# Patient Record
Sex: FEMALE | Race: WHITE | NOT HISPANIC OR LATINO | URBAN - METROPOLITAN AREA
[De-identification: names, ages, dates, MRNs, and addresses within clinical notes are randomized per-mention and may not be internally consistent; named-entity substitution may affect disease eponyms.]

---

## 2017-01-01 ENCOUNTER — INPATIENT (INPATIENT)
Facility: HOSPITAL | Age: 77
LOS: 0 days | End: 2017-06-11
Attending: INTERNAL MEDICINE | Admitting: INTERNAL MEDICINE
Payer: MEDICARE

## 2017-01-01 VITALS — HEIGHT: 62 IN | WEIGHT: 207.01 LBS

## 2017-01-01 VITALS — HEART RATE: 42 BPM

## 2017-01-01 LAB
ALBUMIN SERPL ELPH-MCNC: 2.3 G/DL — LOW (ref 3.3–5)
ALBUMIN SERPL ELPH-MCNC: 3.7 G/DL — SIGNIFICANT CHANGE UP (ref 3.3–5)
ALP SERPL-CCNC: 65 U/L — SIGNIFICANT CHANGE UP (ref 40–120)
ALP SERPL-CCNC: 72 U/L — SIGNIFICANT CHANGE UP (ref 40–120)
ALT FLD-CCNC: 21 U/L — SIGNIFICANT CHANGE UP (ref 12–78)
ALT FLD-CCNC: 36 U/L — SIGNIFICANT CHANGE UP (ref 12–78)
ANION GAP SERPL CALC-SCNC: 10 MMOL/L — SIGNIFICANT CHANGE UP (ref 5–17)
ANION GAP SERPL CALC-SCNC: 16 MMOL/L — SIGNIFICANT CHANGE UP (ref 5–17)
APTT BLD: 26.9 SEC — LOW (ref 27.5–37.4)
AST SERPL-CCNC: 155 U/L — HIGH (ref 15–37)
AST SERPL-CCNC: 290 U/L — HIGH (ref 15–37)
BASE EXCESS BLDA CALC-SCNC: -22 MMOL/L — LOW (ref -2–2)
BASOPHILS # BLD AUTO: 0.1 K/UL — SIGNIFICANT CHANGE UP (ref 0–0.2)
BASOPHILS NFR BLD AUTO: 0.5 % — SIGNIFICANT CHANGE UP (ref 0–2)
BILIRUB DIRECT SERPL-MCNC: 0.2 MG/DL — SIGNIFICANT CHANGE UP (ref 0–0.2)
BILIRUB INDIRECT FLD-MCNC: 0.2 MG/DL — SIGNIFICANT CHANGE UP (ref 0.2–1)
BILIRUB SERPL-MCNC: 0.3 MG/DL — SIGNIFICANT CHANGE UP (ref 0.2–1.2)
BILIRUB SERPL-MCNC: 0.4 MG/DL — SIGNIFICANT CHANGE UP (ref 0.2–1.2)
BUN SERPL-MCNC: 38 MG/DL — HIGH (ref 7–23)
BUN SERPL-MCNC: 41 MG/DL — HIGH (ref 7–23)
CALCIUM SERPL-MCNC: 7.3 MG/DL — LOW (ref 8.5–10.1)
CALCIUM SERPL-MCNC: 9 MG/DL — SIGNIFICANT CHANGE UP (ref 8.5–10.1)
CHLORIDE SERPL-SCNC: 110 MMOL/L — HIGH (ref 96–108)
CHLORIDE SERPL-SCNC: 118 MMOL/L — HIGH (ref 96–108)
CO2 SERPL-SCNC: 11 MMOL/L — LOW (ref 22–31)
CO2 SERPL-SCNC: 22 MMOL/L — SIGNIFICANT CHANGE UP (ref 22–31)
CREAT SERPL-MCNC: 1.98 MG/DL — HIGH (ref 0.5–1.3)
CREAT SERPL-MCNC: 2.34 MG/DL — HIGH (ref 0.5–1.3)
EOSINOPHIL # BLD AUTO: 0 K/UL — SIGNIFICANT CHANGE UP (ref 0–0.5)
EOSINOPHIL NFR BLD AUTO: 0.1 % — SIGNIFICANT CHANGE UP (ref 0–6)
GLUCOSE SERPL-MCNC: 373 MG/DL — HIGH (ref 70–99)
GLUCOSE SERPL-MCNC: 494 MG/DL — CRITICAL HIGH (ref 70–99)
HCO3 BLDA-SCNC: 9 MMOL/L — LOW (ref 21–29)
HCT VFR BLD CALC: 38.8 % — SIGNIFICANT CHANGE UP (ref 34.5–45)
HGB BLD-MCNC: 12.6 G/DL — SIGNIFICANT CHANGE UP (ref 11.5–15.5)
HOROWITZ INDEX BLDA+IHG-RTO: SIGNIFICANT CHANGE UP
INR BLD: 0.99 RATIO — SIGNIFICANT CHANGE UP (ref 0.88–1.16)
LACTATE SERPL-SCNC: 12.6 MMOL/L — CRITICAL HIGH (ref 0.7–2)
LACTATE SERPL-SCNC: 3.6 MMOL/L — HIGH (ref 0.7–2)
LIDOCAIN IGE QN: 125 U/L — SIGNIFICANT CHANGE UP (ref 73–393)
LYMPHOCYTES # BLD AUTO: 1.2 K/UL — SIGNIFICANT CHANGE UP (ref 1–3.3)
LYMPHOCYTES # BLD AUTO: 10.8 % — LOW (ref 13–44)
MAGNESIUM SERPL-MCNC: 2.4 MG/DL — SIGNIFICANT CHANGE UP (ref 1.6–2.6)
MCHC RBC-ENTMCNC: 28.7 PG — SIGNIFICANT CHANGE UP (ref 27–34)
MCHC RBC-ENTMCNC: 32.6 GM/DL — SIGNIFICANT CHANGE UP (ref 32–36)
MCV RBC AUTO: 88.3 FL — SIGNIFICANT CHANGE UP (ref 80–100)
MONOCYTES # BLD AUTO: 0.4 K/UL — SIGNIFICANT CHANGE UP (ref 0–0.9)
MONOCYTES NFR BLD AUTO: 3.4 % — SIGNIFICANT CHANGE UP (ref 2–14)
NEUTROPHILS # BLD AUTO: 9.6 K/UL — HIGH (ref 1.8–7.4)
NEUTROPHILS NFR BLD AUTO: 85.2 % — HIGH (ref 43–77)
NT-PROBNP SERPL-SCNC: 5150 PG/ML — HIGH (ref 0–450)
PCO2 BLDA: 37 MMHG — SIGNIFICANT CHANGE UP (ref 32–46)
PH BLDA: 7 — CRITICAL LOW (ref 7.35–7.45)
PLATELET # BLD AUTO: 194 K/UL — SIGNIFICANT CHANGE UP (ref 150–400)
PO2 BLDA: 82 — SIGNIFICANT CHANGE UP
POTASSIUM SERPL-MCNC: 5.2 MMOL/L — SIGNIFICANT CHANGE UP (ref 3.5–5.3)
POTASSIUM SERPL-MCNC: 6.6 MMOL/L — CRITICAL HIGH (ref 3.5–5.3)
POTASSIUM SERPL-SCNC: 5.2 MMOL/L — SIGNIFICANT CHANGE UP (ref 3.5–5.3)
POTASSIUM SERPL-SCNC: 6.6 MMOL/L — CRITICAL HIGH (ref 3.5–5.3)
PROT SERPL-MCNC: 5.3 GM/DL — LOW (ref 6–8.3)
PROT SERPL-MCNC: 7.3 GM/DL — SIGNIFICANT CHANGE UP (ref 6–8.3)
PROTHROM AB SERPL-ACNC: 10.7 SEC — SIGNIFICANT CHANGE UP (ref 9.8–12.7)
RAPID RVP RESULT: SIGNIFICANT CHANGE UP
RBC # BLD: 4.39 M/UL — SIGNIFICANT CHANGE UP (ref 3.8–5.2)
RBC # FLD: 13.2 % — SIGNIFICANT CHANGE UP (ref 10.3–14.5)
SAO2 % BLDA: 88 — SIGNIFICANT CHANGE UP
SODIUM SERPL-SCNC: 142 MMOL/L — SIGNIFICANT CHANGE UP (ref 135–145)
SODIUM SERPL-SCNC: 145 MMOL/L — SIGNIFICANT CHANGE UP (ref 135–145)
TROPONIN I SERPL-MCNC: 106 NG/ML — HIGH (ref 0.01–0.04)
TROPONIN I SERPL-MCNC: 29.5 NG/ML — HIGH (ref 0.01–0.04)
TROPONIN I SERPL-MCNC: 34.7 NG/ML — HIGH (ref 0.01–0.04)
TSH SERPL-MCNC: 0.41 UU/ML — SIGNIFICANT CHANGE UP (ref 0.36–3.74)
WBC # BLD: 11.2 K/UL — HIGH (ref 3.8–10.5)
WBC # FLD AUTO: 11.2 K/UL — HIGH (ref 3.8–10.5)

## 2017-01-01 PROCEDURE — 93306 TTE W/DOPPLER COMPLETE: CPT | Mod: 26

## 2017-01-01 PROCEDURE — 93010 ELECTROCARDIOGRAM REPORT: CPT | Mod: 77

## 2017-01-01 PROCEDURE — 71010: CPT | Mod: 26

## 2017-01-01 PROCEDURE — 99291 CRITICAL CARE FIRST HOUR: CPT | Mod: 25

## 2017-01-01 PROCEDURE — 93010 ELECTROCARDIOGRAM REPORT: CPT

## 2017-01-01 PROCEDURE — 31500 INSERT EMERGENCY AIRWAY: CPT

## 2017-01-01 RX ORDER — PIPERACILLIN AND TAZOBACTAM 4; .5 G/20ML; G/20ML
3.38 INJECTION, POWDER, LYOPHILIZED, FOR SOLUTION INTRAVENOUS EVERY 8 HOURS
Qty: 0 | Refills: 0 | Status: DISCONTINUED | OUTPATIENT
Start: 2017-01-01 | End: 2017-01-01

## 2017-01-01 RX ORDER — CLOPIDOGREL BISULFATE 75 MG/1
75 TABLET, FILM COATED ORAL DAILY
Qty: 0 | Refills: 0 | Status: DISCONTINUED | OUTPATIENT
Start: 2017-01-01 | End: 2017-01-01

## 2017-01-01 RX ORDER — PIPERACILLIN AND TAZOBACTAM 4; .5 G/20ML; G/20ML
3.38 INJECTION, POWDER, LYOPHILIZED, FOR SOLUTION INTRAVENOUS ONCE
Qty: 0 | Refills: 0 | Status: DISCONTINUED | OUTPATIENT
Start: 2017-01-01 | End: 2017-01-01

## 2017-01-01 RX ORDER — HEPARIN SODIUM 5000 [USP'U]/ML
5600 INJECTION INTRAVENOUS; SUBCUTANEOUS EVERY 6 HOURS
Qty: 0 | Refills: 0 | Status: DISCONTINUED | OUTPATIENT
Start: 2017-01-01 | End: 2017-01-01

## 2017-01-01 RX ORDER — SODIUM CHLORIDE 9 MG/ML
3 INJECTION INTRAMUSCULAR; INTRAVENOUS; SUBCUTANEOUS ONCE
Qty: 0 | Refills: 0 | Status: COMPLETED | OUTPATIENT
Start: 2017-01-01 | End: 2017-01-01

## 2017-01-01 RX ORDER — CLOPIDOGREL BISULFATE 75 MG/1
300 TABLET, FILM COATED ORAL ONCE
Qty: 0 | Refills: 0 | Status: DISCONTINUED | OUTPATIENT
Start: 2017-01-01 | End: 2017-01-01

## 2017-01-01 RX ORDER — VASOPRESSIN 20 [USP'U]/ML
0.04 INJECTION INTRAVENOUS
Qty: 100 | Refills: 0 | Status: DISCONTINUED | OUTPATIENT
Start: 2017-01-01 | End: 2017-01-01

## 2017-01-01 RX ORDER — AZITHROMYCIN 500 MG/1
500 TABLET, FILM COATED ORAL ONCE
Qty: 0 | Refills: 0 | Status: COMPLETED | OUTPATIENT
Start: 2017-01-01 | End: 2017-01-01

## 2017-01-01 RX ORDER — ATORVASTATIN CALCIUM 80 MG/1
40 TABLET, FILM COATED ORAL AT BEDTIME
Qty: 0 | Refills: 0 | Status: DISCONTINUED | OUTPATIENT
Start: 2017-01-01 | End: 2017-01-01

## 2017-01-01 RX ORDER — ASPIRIN/CALCIUM CARB/MAGNESIUM 324 MG
325 TABLET ORAL ONCE
Qty: 0 | Refills: 0 | Status: COMPLETED | OUTPATIENT
Start: 2017-01-01 | End: 2017-01-01

## 2017-01-01 RX ORDER — METOCLOPRAMIDE HCL 10 MG
10 TABLET ORAL ONCE
Qty: 0 | Refills: 0 | Status: COMPLETED | OUTPATIENT
Start: 2017-01-01 | End: 2017-01-01

## 2017-01-01 RX ORDER — IPRATROPIUM/ALBUTEROL SULFATE 18-103MCG
3 AEROSOL WITH ADAPTER (GRAM) INHALATION
Qty: 0 | Refills: 0 | Status: COMPLETED | OUTPATIENT
Start: 2017-01-01 | End: 2017-01-01

## 2017-01-01 RX ORDER — KETAMINE HYDROCHLORIDE 100 MG/ML
200 INJECTION INTRAMUSCULAR; INTRAVENOUS
Qty: 200 | Refills: 0 | Status: DISCONTINUED | OUTPATIENT
Start: 2017-01-01 | End: 2017-01-01

## 2017-01-01 RX ORDER — CEFTRIAXONE 500 MG/1
1 INJECTION, POWDER, FOR SOLUTION INTRAMUSCULAR; INTRAVENOUS ONCE
Qty: 0 | Refills: 0 | Status: COMPLETED | OUTPATIENT
Start: 2017-01-01 | End: 2017-01-01

## 2017-01-01 RX ORDER — HEPARIN SODIUM 5000 [USP'U]/ML
INJECTION INTRAVENOUS; SUBCUTANEOUS
Qty: 25000 | Refills: 0 | Status: DISCONTINUED | OUTPATIENT
Start: 2017-01-01 | End: 2017-01-01

## 2017-01-01 RX ORDER — NOREPINEPHRINE BITARTRATE/D5W 8 MG/250ML
1 PLASTIC BAG, INJECTION (ML) INTRAVENOUS
Qty: 8 | Refills: 0 | Status: DISCONTINUED | OUTPATIENT
Start: 2017-01-01 | End: 2017-01-01

## 2017-01-01 RX ORDER — SODIUM CHLORIDE 9 MG/ML
1000 INJECTION INTRAMUSCULAR; INTRAVENOUS; SUBCUTANEOUS
Qty: 0 | Refills: 0 | Status: DISCONTINUED | OUTPATIENT
Start: 2017-01-01 | End: 2017-01-01

## 2017-01-01 RX ORDER — HEPARIN SODIUM 5000 [USP'U]/ML
5000 INJECTION INTRAVENOUS; SUBCUTANEOUS ONCE
Qty: 0 | Refills: 0 | Status: COMPLETED | OUTPATIENT
Start: 2017-01-01 | End: 2017-01-01

## 2017-01-01 RX ORDER — PIPERACILLIN AND TAZOBACTAM 4; .5 G/20ML; G/20ML
3000 INJECTION, POWDER, LYOPHILIZED, FOR SOLUTION INTRAVENOUS EVERY 6 HOURS
Qty: 3000 | Refills: 0 | Status: DISCONTINUED | OUTPATIENT
Start: 2017-01-01 | End: 2017-01-01

## 2017-01-01 RX ORDER — ASPIRIN/CALCIUM CARB/MAGNESIUM 324 MG
325 TABLET ORAL DAILY
Qty: 0 | Refills: 0 | Status: DISCONTINUED | OUTPATIENT
Start: 2017-01-01 | End: 2017-01-01

## 2017-01-01 RX ORDER — SUCCINYLCHOLINE CHLORIDE 100 MG/5ML
150 SYRINGE (ML) INTRAVENOUS ONCE
Qty: 0 | Refills: 0 | Status: COMPLETED | OUTPATIENT
Start: 2017-01-01 | End: 2017-01-01

## 2017-01-01 RX ORDER — KETAMINE HYDROCHLORIDE 100 MG/ML
188 INJECTION INTRAMUSCULAR; INTRAVENOUS ONCE
Qty: 0 | Refills: 0 | Status: DISCONTINUED | OUTPATIENT
Start: 2017-01-01 | End: 2017-01-01

## 2017-01-01 RX ORDER — SODIUM CHLORIDE 9 MG/ML
2800 INJECTION INTRAMUSCULAR; INTRAVENOUS; SUBCUTANEOUS ONCE
Qty: 0 | Refills: 0 | Status: COMPLETED | OUTPATIENT
Start: 2017-01-01 | End: 2017-01-01

## 2017-01-01 RX ORDER — FUROSEMIDE 40 MG
40 TABLET ORAL ONCE
Qty: 0 | Refills: 0 | Status: COMPLETED | OUTPATIENT
Start: 2017-01-01 | End: 2017-01-01

## 2017-01-01 RX ORDER — ONDANSETRON 8 MG/1
8 TABLET, FILM COATED ORAL ONCE
Qty: 0 | Refills: 0 | Status: COMPLETED | OUTPATIENT
Start: 2017-01-01 | End: 2017-01-01

## 2017-01-01 RX ADMIN — SODIUM CHLORIDE 3 MILLILITER(S): 9 INJECTION INTRAMUSCULAR; INTRAVENOUS; SUBCUTANEOUS at 08:44

## 2017-01-01 RX ADMIN — CEFTRIAXONE 100 GRAM(S): 500 INJECTION, POWDER, FOR SOLUTION INTRAMUSCULAR; INTRAVENOUS at 09:21

## 2017-01-01 RX ADMIN — Medication 176.06 MICROGRAM(S)/KG/MIN: at 11:01

## 2017-01-01 RX ADMIN — Medication 10 MILLIGRAM(S): at 10:58

## 2017-01-01 RX ADMIN — ONDANSETRON 8 MILLIGRAM(S): 8 TABLET, FILM COATED ORAL at 09:21

## 2017-01-01 RX ADMIN — Medication 325 MILLIGRAM(S): at 08:44

## 2017-01-01 RX ADMIN — HEPARIN SODIUM 1 UNIT(S): 5000 INJECTION INTRAVENOUS; SUBCUTANEOUS at 10:34

## 2017-01-01 RX ADMIN — Medication 150 MILLIGRAM(S): at 12:38

## 2017-01-01 RX ADMIN — Medication 325 MILLIGRAM(S): at 10:24

## 2017-01-01 RX ADMIN — KETAMINE HYDROCHLORIDE 188 MILLIGRAM(S): 100 INJECTION INTRAMUSCULAR; INTRAVENOUS at 12:36

## 2017-01-01 RX ADMIN — Medication 125 MILLIGRAM(S): at 09:21

## 2017-01-01 RX ADMIN — Medication 176.06 MICROGRAM(S)/KG/MIN: at 16:15

## 2017-01-01 RX ADMIN — Medication 3 MILLILITER(S): at 10:19

## 2017-01-01 RX ADMIN — Medication 3 MILLILITER(S): at 09:22

## 2017-01-01 RX ADMIN — Medication 3 MILLILITER(S): at 11:55

## 2017-01-01 RX ADMIN — Medication 40 MILLIGRAM(S): at 11:01

## 2017-01-01 RX ADMIN — AZITHROMYCIN 255 MILLIGRAM(S): 500 TABLET, FILM COATED ORAL at 09:35

## 2017-01-01 RX ADMIN — SODIUM CHLORIDE 2800 MILLILITER(S): 9 INJECTION INTRAMUSCULAR; INTRAVENOUS; SUBCUTANEOUS at 08:44

## 2017-01-01 RX ADMIN — VASOPRESSIN 2.4 UNIT(S)/MIN: 20 INJECTION INTRAVENOUS at 16:15

## 2017-06-11 NOTE — CONSULT NOTE ADULT - ASSESSMENT
Acute MI  Acute vs acute on chronic systolic /diastolic CHF  Hypotension  Cardiogenic shock  r/o PNA  Renal failure - possibly acute  Uncontrolled DM  PAD - s/p b/l CEA    Suggest:    CCU  O2 supplement  ABG  BiPAP is BP allows  Levophed or neosynepherine  IABP if Bp remains low  Echo stat to evaluate EF, valves  Diuresis  No ACEi ARBs till renal function is stable  beta blockers when CHF stabilizes  f/u renal function, electrolytes  ischemia evaluation when pulm status, renal statue stabilizes - will eventually need cardiac cath  Follow up Cardiac enzymes  Treat with aspirin, Plavix  IV Heparin  Statins  Nitrates PRN if BP allows  CT chest - r/o PNA  Optimize DM control  Low salt, low cholesterol 1800 terri ADA diet  Fluid restriction  Consult with Intensive Care MD - called    Not going to the cath lab at this time because of comorbidities and event being > 12 hours ago. Stabilize overall condition first.     D/W pt's family at bedside    Dr Otero to assume pt's care from AM

## 2017-06-11 NOTE — CONSULT NOTE ADULT - ASSESSMENT
PROBLEMS; 77 Y.O.F    Ac respiratory failure  Septic/cardiogenic  shock  Metabolic acidosis  RLL pneumonia  AMI    PLAN:    [facility]  Intubate  Iv abx zosyn  Change levophed to nicole  CT chest/abdomen  supportive care  DVT prophylasix

## 2017-06-11 NOTE — ED PROVIDER NOTE - PROGRESS NOTE DETAILS
paged dr. sandoval [phone] , request ekg , 10 am notified to call keshia. pt and family updated and pt admitted . dr. sandoval request heaprin and ASA PCI was not called intitially because no prior ekg and ekg significant for RBBB and repeat ekg no change

## 2017-06-11 NOTE — CONSULT NOTE ADULT - SUBJECTIVE AND OBJECTIVE BOX
CC:    Sob/hypotension/bipap      HPI:    78 yo female with progressive dyspnea/orthopnea/pressure in the chest for the last 3-4 days but worse now. She has diarrhea & was eating ice cream, pat came to ER with positive troponins 30 & EKG changes, hypotension & desaturating, pat was put on bipap & started iv fluids & & levophed & now tachcardic 160, ABG ABG - pH: 7.00  /  pCO2: 37    /  pO2: 82    / HCO3: 9     / Base Excess: -22   /  SaO2: 88 , CXR RLL pneumonia, Abd pain & discomfort.Pat on 3rd liters. Pat  now will intubated & will get CT abdomen & chest. PT also got Iv ABX.              PAST MEDICAL & SURGICAL HISTORY:  Essential hypertension  DM  HLD  No significant past surgical history      FAMILY HISTORY:  No pertinent family history in first degree relatives      Social Hx:    Allergies    No Known Allergies    Intolerances        Height (cm): 157.5 (06-11 @ 08:08)  Weight (kg): 93.9 (06-11 @ 08:08)  BMI (kg/m2): 37.9 (06-11 @ 08:08)    ICU Vital Signs Last 24 Hrs  T(C): 36.5, Max: 36.5 (06-11 @ 08:30)  T(F): 97.7, Max: 97.7 (06-11 @ 08:30)  HR: 155 (111 - 159)  BP: 123/86 (93/68 - 124/81)  BP(mean): --  ABP: --  ABP(mean): --  RR: 25 (16 - 27)  SpO2: 96% (90% - 100%)          I&O's Summary                            12.6   11.2  )-----------( 194      ( 11 Jun 2017 08:38 )             38.8       06-11    142  |  110<H>  |  41<H>  ----------------------------<  373<H>  5.2   |  22  |  1.98<H>    Ca    9.0      11 Jun 2017 08:38  Mg     2.4     06-11    TPro  7.3  /  Alb  3.7  /  TBili  0.3  /  DBili  x   /  AST  155<H>  /  ALT  21  /  AlkPhos  72  06-11      CARDIAC MARKERS ( 11 Jun 2017 11:01 )  34.700 ng/mL / x     / x     / x     / x      CARDIAC MARKERS ( 11 Jun 2017 08:38 )  29.500 ng/mL / x     / x     / x     / x                    MEDICATIONS  (STANDING):  aspirin enteric coated 325milliGRAM(s) Oral daily  norepinephrine Infusion 1MICROgram(s)/kG/Min IV Continuous <Continuous>  clopidogrel Tablet 300milliGRAM(s) Oral once  clopidogrel Tablet 75milliGRAM(s) Oral daily  heparin  Infusion. Unit(s)/Hr IV Continuous <Continuous>  atorvastatin 40milliGRAM(s) Oral at bedtime    MEDICATIONS  (PRN):  heparin  Injectable 5600Unit(s) IV Push every 6 hours PRN For aPTT less than 40      DVT Prophylaxis:    Advanced Directives:  Discussed with:    Visit Information:    ** Time is exclusive of billed procedures and/or teaching and/or routine family updates.

## 2017-06-11 NOTE — PROCEDURE NOTE - NSPROCDETAILS_GEN_ALL_CORE
sterile technique, catheter placed/guidewire recovered/lumen(s) aspirated and flushed/ultrasound guidance/sterile dressing applied

## 2017-06-11 NOTE — ED ADULT NURSE REASSESSMENT NOTE - NS ED NURSE REASSESS COMMENT FT1
Pt currently getting bedside ECHO, RN to take pt to CT scan after ECHO completed. MD Ramirez consulted with patient. CCU called for report, CCU to come to transfer pt to unit after CT scan of abdomen completed.

## 2017-06-11 NOTE — ED PROVIDER NOTE - MEDICAL DECISION MAKING DETAILS
76 yo F with hx of HTN, DM, hld presents with chest pain, sob, diarrhea for 1 day will obtain labs and ct

## 2017-06-11 NOTE — ED PROVIDER NOTE - OBJECTIVE STATEMENT
76 yo F with hx of HTN, HLD, DM with cp and sob,. for 1 day denies HA, dizziness, abdpaoin, n/v. pt has diarrhea

## 2017-06-11 NOTE — ED PROCEDURE NOTE - CPROC ED TRACHE INTUB DETAIL1
Patient was pre-oxygenated. An endotracheal tube (ETT) was placed through the vocal cords into the trachea. During intubation, staff applied gentle pressure to the cricoid cartilage. ETT position was confirmed by auscultation of bilateral breath sounds to all lung fields. ETCO2 level was appropriate./Difficult/crash intubation (see additional details section)./Patient connected to ventilator with settings as ordered.

## 2017-06-11 NOTE — PROGRESS NOTE ADULT - SUBJECTIVE AND OBJECTIVE BOX
Labs and events reviewed. Pt with severe metabolic acidosis. High lactic acid levels. Increasing creatinine and LFTs. Multiorgan failure. Shock, not responding to pressors. Profound bradycardia and asystole noted. CPR and ACLS protocol started. She could not be resuscitated. Family at bedside.

## 2017-06-11 NOTE — ED ADULT NURSE REASSESSMENT NOTE - NS ED NURSE REASSESS COMMENT FT1
Pt's status declined. ABG results came back, pt requiring intubation. MD [place] and respiratory therapy performed intubation, ET tube size 7.5, 23 lip OGT also inserted. Succinycholine given as well as Ketamine IVP. Pharmacy to make ketamine drip for pt. Pt still receiving IV pressor medication. RN's at bedside. CCU made aware, pt to be transferred as soon as ready to unit. RN to continue to monitor. Pt's status declined. ABG results came back, pt requiring intubation. MD Burnham and respiratory therapy performed intubation, ET tube size 7.5, 23 lip OGT also inserted at 1230. OGT with 100cc's of secretion. Intubation confirmed with ETCO2 color change noted, b/l breath sounds with chest rise and CXR completed. RN to continue to monitor. Awaiting transport to CT scan then to CCU. Pt's status declined. ABG results came back, pt requiring intubation. MD Burnham and respiratory therapy performed intubation, ET tube size 7.5, 23 lip OGT also inserted at 1230. OGT with 100cc's of secretion. Intubation confirmed with ETCO2 color change noted, b/l breath sounds with chest rise and CXR completed. Pt's skin very pale, cool and clammy, capillary refill 6-8 secs, thready central and peripheral pulses noted, chest cyanotic, MD made aware and at bedside re-assessing patient with intensivist and cardiology notified. Family present at bedside, aware of patient declining status. RN to continue to monitor.

## 2017-06-11 NOTE — ED PROVIDER NOTE - CRITICAL CARE PROVIDED
direct patient care (not related to procedure)/consultation with other physicians/documentation/additional history taking/interpretation of diagnostic studies

## 2017-06-11 NOTE — ED ADULT NURSE NOTE - OBJECTIVE STATEMENT
Pt c/o SOB since yesterday, came in this morning stating she felt nauseous, dizzy and having trouble breathing.

## 2017-06-11 NOTE — ED PROVIDER NOTE - CARE PLAN
Principal Discharge DX:	Shortness of breath  Secondary Diagnosis:	Diarrhea in adult patient Principal Discharge DX:	Shortness of breath  Secondary Diagnosis:	Diarrhea in adult patient  Secondary Diagnosis:	NSTEMI (non-ST elevated myocardial infarction)

## 2017-06-11 NOTE — ED ADULT NURSE REASSESSMENT NOTE - NS ED NURSE REASSESS COMMENT FT1
Pt transferred from bed 11 to trauma room 3. Pt's BP has been deteriorating, and pt's WOB has worseened. Pt put on norepi drip and IV bolus given as per orders. Respiratory therapy at bedside, pt placed on CPAP and O2 status has since improved. Pt's BP has increased since norepi drip (see flow-sheet). Pt awaiting transfer to CCU. RN to continue to monitor.

## 2017-06-11 NOTE — CONSULT NOTE ADULT - SUBJECTIVE AND OBJECTIVE BOX
Chief complaint of dyspnea, chest tightness    HPI: 78 yo female with dyspnea, progressively worsening over the last one week. She has orthopnea since yesterday. Symptoms worse since yesterday late afternoon. c/o pressure in the chest for the last 3-4 days but worse now. She has diarrhea. She has been eating ice cream since yesterday because is unable to tolerate anything else. no medications taken. Denies prior hx of renal failure or cardiac issues.      PAST MEDICAL & SURGICAL HISTORY:  Essential hypertension  DM  B/L CEA    PREVIOUS CARDIAC WORKUP:   None recent.     ALLERGIES:    No Known Allergies       MEDICATIONS  (HOME):      FAMILY HISTORY:  No pertinent family history in first degree relatives        SOCIAL HISTORY:  Nonsmoker. No ETOH abuse. No illicit drugs.     ROS:    A comprehensive review of systems was performed and pertinent items are noted in the history above. A detailed ROS is as follows:    Constitutional:	+ weakness, no fever, no chills, no weight change, no appetite changes  HEENT: 	no vision change, no hearing loss, no epistaxis, no sore throat, no hoarseness  Neck:		no neck pain, no neck swelling, no swollen glands  Respiratory:	+ dyspnea, no cough, no wheezing, no hemoptysis  Cardiac:	+ chest pain, no palpitations, + orthopnea, no dizziness, no syncope  GI:		+ nausea, no vomiting, no change in BM, no GI bleed, no melena, no abdominal pain, no GERD, no dysphagia  :		no dysuria, no polyuria, no hematuria, no incontinence  Skin/Breast: 	no skin rash, no breast mass  Heme: 		no bleeding disorder, no clotting disorder, no easy bruising, no swollen lymph nodes  MS:		no arthralgia, no back pain, no myalgia  Vascular:	no claudication, no varicose veins, no ulcers  Neuro:		no sensory loss, no motor loss, no tremors, no seizures, no gait problems, no headache  Behavioral: 	no mood change, depression, anxiety, suicidal attempts  Endocrine:	no polydipsia, no polyphagia, no polyuria, no skin dryness, no temperature intolerance  Immunologic:	no anaphylaxis, no angioedema, no urticaria      Vital Signs Last 24 Hrs  T(C): 36.5, Max: 36.5 (06-11 @ 08:30)  T(F): 97.7, Max: 97.7 (06-11 @ 08:30)  HR: 111 (111 - 120)  BP: 96/61 (93/68 - 106/48)  BP(mean): --  RR: 21 (16 - 21)  SpO2: 100% (90% - 100%)      PHYSICAL EXAM:    General Appearance:    Pallor, diaphoretic, AAO X 3, mild respiratory distress  HEENT:                       Atraumatic, PERRLA, EOMI, conjunctiva clear.   Neck:                          Supple, no adenopathy, no thyromegaly  Chest:                         B/L rales. Symmetric air entry, + tachypnea. No retractions or cyanosis  Heart:                          Tachycardiac S1, S2 normal, +S3 gallop, + murmur.  Abdomen:                    Soft, non-tender, bowel sounds active. No palpable masses.   Extremities:                 no cyanosis, + edema, no joint swelling. Peripheral pulses palpable  Skin:                           Skin color, texture normal. No rashes   Neurologic:                  Grossly cranial nerves intact, sensory and motor normal.      TELEMETRY:   Sinus tachy    ECG:  Sinus tachy, bifascicular block, ? ST elevation in lead III, aVR    LABS:                        12.6   11.2  )-----------( 194      ( 11 Jun 2017 08:38 )             38.8     06-11    142  |  110<H>  |  41<H>  ----------------------------<  373<H>  5.2   |  22  |  1.98<H>    Ca    9.0      11 Jun 2017 08:38  Mg     2.4     06-11    TPro  7.3  /  Alb  3.7  /  TBili  0.3  /  DBili  x   /  AST  155<H>  /  ALT  21  /  AlkPhos  72  06-11 06-11 @ 08:38  Trop-I  29.500    Pro BNP  5150 06-11 @ 08:38      PT/INR - ( 11 Jun 2017 08:38 )   PT: 10.7 sec;   INR: 0.99 ratio    PTT - ( 11 Jun 2017 08:38 )  PTT:26.9 sec    RADIOLOGY & ADDITIONAL STUDIES:    CXR: Pulm edema

## 2017-06-11 NOTE — ED ADULT NURSE REASSESSMENT NOTE - NS ED NURSE REASSESS COMMENT FT1
Hourly rounding completed by RN. Pt currently receiving nebulizer treatments, IVF bolus and IV antibiotics. Family at bedside, pt and family updated on POC. RN to continue to monitor.

## 2017-06-11 NOTE — ED ADULT NURSE REASSESSMENT NOTE - NS ED NURSE REASSESS COMMENT FT1
Pt transferred to CCU at 1330. Bedside hand-off given. Pt transferred to CCU at 1330 attached to monitor, respiratory assisted. Bedside hand-off given.

## 2017-06-11 NOTE — PROGRESS NOTE ADULT - SUBJECTIVE AND OBJECTIVE BOX
Patient seen in the ICU.  Hypotensive despite levo.  Poor ox sat tracing.         PAST MEDICAL & SURGICAL HISTORY:  Essential hypertension  No significant past surgical history      FAMILY HISTORY:  No pertinent family history in first degree relatives      Social Hx:    Allergies    No Known Allergies    Intolerances        Height (cm): 157.5 (06-11 @ 08:08)  Weight (kg): 93.9 (06-11 @ 08:08)  BMI (kg/m2): 37.9 (06-11 @ 08:08)    ICU Vital Signs Last 24 Hrs  T(C): 36.5, Max: 36.5 (06-11 @ 08:30)  T(F): 97.7, Max: 97.7 (06-11 @ 08:30)  HR: 87 (87 - 159)  BP: 74/58 (74/58 - 124/81)  BP(mean): --  ABP: --  ABP(mean): --  RR: 19 (16 - 27)  SpO2: 96% (90% - 100%)      Mode: AC/ CMV (Assist Control/ Continuous Mandatory Ventilation)  RR (machine): 12  TV (machine): 500  FiO2: 100  PEEP: 5  ITime: 1  PIP: 32      I&O's Summary                            12.6   11.2  )-----------( 194      ( 11 Jun 2017 08:38 )             38.8       06-11    145  |  118<H>  |  38<H>  ----------------------------<  494<HH>  6.6<HH>   |  11<L>  |  2.34<H>    Ca    7.3<L>      11 Jun 2017 14:14  Mg     2.4     06-11    TPro  x   /  Alb  2.3<L>  /  TBili  x   /  DBili  x   /  AST  290<H>  /  ALT  36  /  AlkPhos  x   06-11      CARDIAC MARKERS ( 11 Jun 2017 11:01 )  34.700 ng/mL / x     / x     / x     / x      CARDIAC MARKERS ( 11 Jun 2017 08:38 )  29.500 ng/mL / x     / x     / x     / x            ABG - ( 11 Jun 2017 12:04 )  pH: 7.00  /  pCO2: 37    /  pO2: 82    / HCO3: 9     / Base Excess: -22   /  SaO2: 88          A/P Patient in shock--Likely multifactorial(septic with a Cardiac Component).  Patient in also SURESH from likely ATN forom Shock.  Severe lactate acidosis      Plan:  CCU  Emergently placed a CVP  Added Vaso to levo for profound hypotension  Stat lactate and chem 7 --showed a lactate of 12 and hyperkalemia  For the hyperkalemia D-50, and Bicarb(2 amps)given.   Cardio called in while the patient was having pauses  At the time of the pauses she was paced with pacing pads  On bedside Echo i preformed she still had cardiac function  The patient eventually went into asystole    CPR preformed with out success.    An agonal rhythm was recovered and I called the family in   She passed with her family at bedside.                      MEDICATIONS  (STANDING):  aspirin enteric coated 325milliGRAM(s) Oral daily  clopidogrel Tablet 300milliGRAM(s) Oral once  clopidogrel Tablet 75milliGRAM(s) Oral daily  heparin  Infusion. Unit(s)/Hr IV Continuous <Continuous>  atorvastatin 40milliGRAM(s) Oral at bedtime  sodium chloride 0.9%. 1000milliLiter(s) IV Continuous <Continuous>  piperacillin/tazobactam IVPB. 3.375Gram(s) IV Intermittent once  piperacillin/tazobactam IVPB. 3.375Gram(s) IV Intermittent every 8 hours  vasopressin Infusion 0.04Unit(s)/Min IV Continuous <Continuous>  norepinephrine Infusion 1MICROgram(s)/kG/Min IV Continuous <Continuous>    MEDICATIONS  (PRN):  heparin  Injectable 5600Unit(s) IV Push every 6 hours PRN For aPTT less than 40      DVT Prophylaxis:    Advanced Directives:  Discussed with:    Visit Information:  time 75 minutes    ** Time is exclusive of billed procedures and/or teaching and/or routine family updates.

## 2017-06-15 DIAGNOSIS — I10 ESSENTIAL (PRIMARY) HYPERTENSION: ICD-10-CM

## 2017-06-15 DIAGNOSIS — E11.9 TYPE 2 DIABETES MELLITUS WITHOUT COMPLICATIONS: ICD-10-CM

## 2017-06-15 DIAGNOSIS — A41.9 SEPSIS, UNSPECIFIED ORGANISM: ICD-10-CM

## 2017-06-15 DIAGNOSIS — E87.5 HYPERKALEMIA: ICD-10-CM

## 2017-06-15 DIAGNOSIS — J18.9 PNEUMONIA, UNSPECIFIED ORGANISM: ICD-10-CM

## 2017-06-15 DIAGNOSIS — R00.1 BRADYCARDIA, UNSPECIFIED: ICD-10-CM

## 2017-06-15 DIAGNOSIS — I21.4 NON-ST ELEVATION (NSTEMI) MYOCARDIAL INFARCTION: ICD-10-CM

## 2017-06-15 DIAGNOSIS — E87.2 ACIDOSIS: ICD-10-CM

## 2017-06-15 DIAGNOSIS — R57.0 CARDIOGENIC SHOCK: ICD-10-CM

## 2017-06-15 DIAGNOSIS — R19.7 DIARRHEA, UNSPECIFIED: ICD-10-CM

## 2017-06-15 DIAGNOSIS — I50.43 ACUTE ON CHRONIC COMBINED SYSTOLIC (CONGESTIVE) AND DIASTOLIC (CONGESTIVE) HEART FAILURE: ICD-10-CM

## 2017-06-15 DIAGNOSIS — J96.00 ACUTE RESPIRATORY FAILURE, UNSPECIFIED WHETHER WITH HYPOXIA OR HYPERCAPNIA: ICD-10-CM

## 2017-06-15 DIAGNOSIS — I73.9 PERIPHERAL VASCULAR DISEASE, UNSPECIFIED: ICD-10-CM

## 2017-06-15 DIAGNOSIS — R06.02 SHORTNESS OF BREATH: ICD-10-CM

## 2017-06-15 DIAGNOSIS — I95.9 HYPOTENSION, UNSPECIFIED: ICD-10-CM

## 2017-06-15 DIAGNOSIS — N17.0 ACUTE KIDNEY FAILURE WITH TUBULAR NECROSIS: ICD-10-CM

## 2017-06-15 RX ADMIN — KETAMINE HYDROCHLORIDE 14.09 MILLIGRAM(S): 100 INJECTION INTRAMUSCULAR; INTRAVENOUS at 11:48

## 2017-06-16 DIAGNOSIS — I11.0 HYPERTENSIVE HEART DISEASE WITH HEART FAILURE: ICD-10-CM

## 2017-06-16 DIAGNOSIS — E11.65 TYPE 2 DIABETES MELLITUS WITH HYPERGLYCEMIA: ICD-10-CM

## 2017-06-16 DIAGNOSIS — E11.51 TYPE 2 DIABETES MELLITUS WITH DIABETIC PERIPHERAL ANGIOPATHY WITHOUT GANGRENE: ICD-10-CM

## 2017-06-16 LAB
CULTURE RESULTS: SIGNIFICANT CHANGE UP
CULTURE RESULTS: SIGNIFICANT CHANGE UP
SPECIMEN SOURCE: SIGNIFICANT CHANGE UP
SPECIMEN SOURCE: SIGNIFICANT CHANGE UP

## 2020-01-23 NOTE — ED PROVIDER NOTE - CONSTITUTIONAL, MLM
atrial fibrillation normal... Well appearing, well nourished, awake, alert, oriented to person, place, time/situation and in no apparent distress.

## 2023-07-24 NOTE — ED ADULT NURSE NOTE - TOBACCO SCREENING (FROM THE ASSIST)
Make sure to be consistent with steroid cream twice a day for the next 3 weeks. Only use the cream at the L ankle and then wash your hands after use.  If minimal improvement after 3 weeks, let me know and we will refer you to a dermatologist. Statement Selected

## 2023-12-13 NOTE — CONSULT NOTE ADULT - PROVIDER SPECIALTY LIST ADULT
Cardiology
Critical Care
Detail Level: Detailed
Quality 226: Preventive Care And Screening: Tobacco Use: Screening And Cessation Intervention: Patient screened for tobacco use and is an ex/non-smoker
Quality 111:Pneumonia Vaccination Status For Older Adults: Patient received any pneumococcal conjugate or polysaccharide vaccine on or after their 60th birthday and before the end of the measurement period
Quality 110: Preventive Care And Screening: Influenza Immunization: Influenza Immunization previously received during influenza season